# Patient Record
Sex: MALE | Race: WHITE | NOT HISPANIC OR LATINO | Employment: STUDENT | URBAN - METROPOLITAN AREA
[De-identification: names, ages, dates, MRNs, and addresses within clinical notes are randomized per-mention and may not be internally consistent; named-entity substitution may affect disease eponyms.]

---
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
## 2024-12-24 ENCOUNTER — OFFICE VISIT (OUTPATIENT)
Dept: URGENT CARE | Facility: CLINIC | Age: 19
End: 2024-12-24
Payer: OTHER GOVERNMENT

## 2024-12-24 VITALS
BODY MASS INDEX: 27.46 KG/M2 | RESPIRATION RATE: 17 BRPM | DIASTOLIC BLOOD PRESSURE: 103 MMHG | WEIGHT: 155 LBS | TEMPERATURE: 98 F | HEIGHT: 63 IN | SYSTOLIC BLOOD PRESSURE: 148 MMHG | OXYGEN SATURATION: 98 % | HEART RATE: 67 BPM

## 2024-12-24 DIAGNOSIS — H72.92 PERFORATION OF LEFT TYMPANIC MEMBRANE: Primary | ICD-10-CM

## 2024-12-24 RX ORDER — CIPROFLOXACIN AND DEXAMETHASONE 3; 1 MG/ML; MG/ML
4 SUSPENSION/ DROPS AURICULAR (OTIC) 2 TIMES DAILY
Qty: 7.5 ML | Refills: 0 | Status: SHIPPED | OUTPATIENT
Start: 2024-12-24 | End: 2024-12-31

## 2024-12-24 RX ORDER — AMOXICILLIN AND CLAVULANATE POTASSIUM 875; 125 MG/1; MG/1
1 TABLET, FILM COATED ORAL EVERY 12 HOURS
Qty: 20 TABLET | Refills: 0 | Status: SHIPPED | OUTPATIENT
Start: 2024-12-24 | End: 2025-01-03

## 2024-12-24 NOTE — PROGRESS NOTES
"Subjective:      Patient ID: Benjy Oh is a 19 y.o. male.    Vitals:  height is 5' 3" (1.6 m) and weight is 70.3 kg (155 lb). His temperature is 98 °F (36.7 °C). His blood pressure is 148/103 (abnormal) and his pulse is 67. His respiration is 17 and oxygen saturation is 98%.     Chief Complaint: Otalgia    Male reports in the past week having nasal sinus congestion developing left ear pain waking at 2:00 a.m. having ear canal nonbloody drainage with hearing loss presents to urgent care today for initial evaluation.  Patient reports no OTC for nasal congestion over the past week while working.      Otalgia   There is pain in the left ear. This is a new problem. Associated symptoms include ear discharge and hearing loss. Pertinent negatives include no headaches.     Constitution: Negative for fever.   HENT:  Positive for ear pain, ear discharge, hearing loss and congestion.    Respiratory: Negative.     Skin:  Negative for erythema.   Neurological:  Negative for dizziness and headaches.      Objective:     Physical Exam   Constitutional: He is oriented to person, place, and time.  Non-toxic appearance.      Comments:Awake alert pleasant ambulatory male     HENT:   Head: Normocephalic and atraumatic.   Ears:   Right Ear: Tympanic membrane, external ear and ear canal normal. No swelling or tenderness. Tympanic membrane is not perforated and not erythematous. No decreased hearing is noted.   Left Ear: External ear and ear canal normal. No swelling or tenderness. Tympanic membrane is perforated and erythematous. Decreased hearing is noted.   Nose: Mucosal edema and congestion present. No rhinorrhea or purulent discharge. Right sinus exhibits no maxillary sinus tenderness and no frontal sinus tenderness. Left sinus exhibits no maxillary sinus tenderness and no frontal sinus tenderness.   Mouth/Throat: Mucous membranes are moist. No oropharyngeal exudate or posterior oropharyngeal erythema.   Left tympanic membrane " "erythematous emaciated with suspected microperforation scant 2 mm bloody scab no active bleeding, no macro perforation able to be observed today.      Comments: Left tympanic membrane erythematous emaciated with suspected microperforation scant 2 mm bloody scab no active bleeding, no macro perforation able to be observed today.  Neck: Neck supple. No neck rigidity present.   Cardiovascular: Normal rate and normal pulses.   Pulmonary/Chest: Effort normal and breath sounds normal. No respiratory distress.   Musculoskeletal: Normal range of motion.         General: Normal range of motion.      Cervical back: He exhibits no tenderness.   Lymphadenopathy:     He has no cervical adenopathy.   Neurological: no focal deficit. He is alert and oriented to person, place, and time. He displays no weakness.   Skin: Skin is warm, dry, not diaphoretic and no rash. No erythema          Previous History      Review of patient's allergies indicates:  No Known Allergies    History reviewed. No pertinent past medical history.  Current Outpatient Medications   Medication Instructions    amoxicillin-clavulanate 875-125mg (AUGMENTIN) 875-125 mg per tablet 1 tablet, Oral, Every 12 hours    ciprofloxacin-dexAMETHasone 0.3-0.1% (CIPRODEX) 0.3-0.1 % DrpS 4 drops, Both Ears, 2 times daily     History reviewed. No pertinent surgical history.  No family history on file.    Social History     Tobacco Use    Smoking status: Never     Passive exposure: Never    Smokeless tobacco: Never   Substance Use Topics    Alcohol use: Never    Drug use: Never        Physical Exam      Vital Signs Reviewed   BP (!) 148/103   Pulse 67   Temp 98 °F (36.7 °C)   Resp 17   Ht 5' 3" (1.6 m)   Wt 70.3 kg (155 lb)   SpO2 98%   BMI 27.46 kg/m²        Procedures    Procedures     Labs   No results found for this or any previous visit.    Assessment:     1. Perforation of left tympanic membrane        Plan:   Patient understands concern suspicion recent nasal " sinus congestion Eustachian tube dysfunction with otitis media and perforation discharge plan with antibiotic coverage, eardrops prescription and ENT referral for further hearing evaluation and continued long-term care planning.  Patient verbalized understanding is ready for discharge now    High concern for left ear infection with suspected perforation.  Recommend Augmentin antibiotic coverage.  May alternate Tylenol and ibuprofen every 6-8 hours if needed for mild-to-moderate pain and inflammation.  Recommend daily non sedating antihistamine Claritin Zyrtec or loratadine over the next 1-2 weeks.  Recommend Sudafed decongestant over the next week if needed for nasal sinus congestion and left ear pressure and inflammation with 3 day limited Afrin or Andrew-Synephrine nasal spray.  May apply Ciprodex drops to left ear canal if needed for pain and inflammation.    Recommend follow-up with ear nose and throat specialist in the next week for ear pain perforation hearing changes further evaluation.  Recommended emergency department evaluation immediately if symptoms worsen  Perforation of left tympanic membrane  -     Ambulatory referral/consult to ENT    Other orders  -     amoxicillin-clavulanate 875-125mg (AUGMENTIN) 875-125 mg per tablet; Take 1 tablet by mouth every 12 (twelve) hours. for 10 days  Dispense: 20 tablet; Refill: 0  -     ciprofloxacin-dexAMETHasone 0.3-0.1% (CIPRODEX) 0.3-0.1 % DrpS; Place 4 drops into both ears 2 (two) times daily. for 7 days  Dispense: 7.5 mL; Refill: 0

## 2024-12-24 NOTE — PATIENT INSTRUCTIONS
High concern for left ear infection with suspected perforation.  Recommend Augmentin antibiotic coverage.  May alternate Tylenol and ibuprofen every 6-8 hours if needed for mild-to-moderate pain and inflammation.  Recommend daily non sedating antihistamine Claritin Zyrtec or loratadine over the next 1-2 weeks.  Recommend Sudafed decongestant over the next week if needed for nasal sinus congestion and left ear pressure and inflammation with 3 day limited Afrin or Andrew-Synephrine nasal spray.  May apply Ciprodex drops to left ear canal if needed for pain and inflammation.    Recommend follow-up with ear nose and throat specialist in the next week for ear pain perforation hearing changes further evaluation.  Recommended emergency department evaluation immediately if symptoms worsen